# Patient Record
Sex: FEMALE | Race: WHITE | NOT HISPANIC OR LATINO | URBAN - NONMETROPOLITAN AREA
[De-identification: names, ages, dates, MRNs, and addresses within clinical notes are randomized per-mention and may not be internally consistent; named-entity substitution may affect disease eponyms.]

---

## 2023-05-18 ENCOUNTER — APPOINTMENT (OUTPATIENT)
Dept: URBAN - NONMETROPOLITAN AREA SURGERY 8 | Age: 63
Setting detail: DERMATOLOGY
End: 2023-05-23

## 2023-05-18 DIAGNOSIS — K13.0 DISEASES OF LIPS: ICD-10-CM

## 2023-05-18 PROCEDURE — OTHER PRESCRIPTION MEDICATION MANAGEMENT: OTHER

## 2023-05-18 PROCEDURE — OTHER MIPS QUALITY: OTHER

## 2023-05-18 PROCEDURE — OTHER COUNSELING: OTHER

## 2023-05-18 PROCEDURE — 99203 OFFICE O/P NEW LOW 30 MIN: CPT

## 2023-05-18 ASSESSMENT — LOCATION ZONE DERM: LOCATION ZONE: LIP

## 2023-05-18 ASSESSMENT — LOCATION SIMPLE DESCRIPTION DERM
LOCATION SIMPLE: UPPER LIP
LOCATION SIMPLE: LEFT LIP

## 2023-05-18 ASSESSMENT — LOCATION DETAILED DESCRIPTION DERM
LOCATION DETAILED: PHILTRUM
LOCATION DETAILED: LEFT INFERIOR VERMILION LIP

## 2023-05-18 NOTE — PROCEDURE: PRESCRIPTION MEDICATION MANAGEMENT
Plan: Persistent cheilitis of the lips, frequent lip licking during the exam\\nShe has tried numerous treatments\\nPatch testing notable for lanolin sensitivity, she is avoiding\\nshe is using primarily Vaseline at the moment\\nwe discussed using protopic to the lips bid when active, she is concerned about the long term side effects\\nwe discussed that the black box warning is derived from the systemic form of tacrolimus, it is highly unlikely that the topical form will cause any issue when used on an as needed basis externally
normal/well-groomed/no distress
Detail Level: Zone
Render In Strict Bullet Format?: No

## 2023-05-18 NOTE — HPI: BODY LOCATION - LIP
Additional History: Patient states her lip irritation started about a year ago. She’s tried Nystatin, Prednisone, Tacrolimus, Mupirocin. The tacrolimus has helped but she did not want to use it for an extended amount of time.